# Patient Record
Sex: MALE | Race: WHITE | NOT HISPANIC OR LATINO | Employment: FULL TIME | URBAN - NONMETROPOLITAN AREA
[De-identification: names, ages, dates, MRNs, and addresses within clinical notes are randomized per-mention and may not be internally consistent; named-entity substitution may affect disease eponyms.]

---

## 2023-04-26 ENCOUNTER — OFFICE VISIT (OUTPATIENT)
Dept: URGENT CARE | Facility: CLINIC | Age: 28
End: 2023-04-26

## 2023-04-26 VITALS
SYSTOLIC BLOOD PRESSURE: 118 MMHG | TEMPERATURE: 96.7 F | DIASTOLIC BLOOD PRESSURE: 80 MMHG | RESPIRATION RATE: 16 BRPM | HEART RATE: 67 BPM | OXYGEN SATURATION: 98 %

## 2023-04-26 DIAGNOSIS — H10.32 ACUTE BACTERIAL CONJUNCTIVITIS OF LEFT EYE: Primary | ICD-10-CM

## 2023-04-26 RX ORDER — POLYMYXIN B SULFATE AND TRIMETHOPRIM 1; 10000 MG/ML; [USP'U]/ML
1 SOLUTION OPHTHALMIC EVERY 6 HOURS
Qty: 10 ML | Refills: 0 | Status: SHIPPED | OUTPATIENT
Start: 2023-04-26 | End: 2023-05-03

## 2023-04-26 NOTE — PROGRESS NOTES
[email or web address] [facility]        NAME: Shasta Srivastava is a 25 y o  male  : 1995    MRN: [id number]  DATE: 2023  TIME: 1:32 PM    Assessment and Plan   Acute bacterial conjunctivitis of left eye [H10 32]  1  Acute bacterial conjunctivitis of left eye  polymyxin b-trimethoprim (POLYTRIM) ophthalmic solution        Declined strep testing for sore throat  Patient Instructions     Use Polytrim as prescribed   Apply cold compresses  Avoid touching eyes  Wash hands frequently  Change pillowcases daily  Follow up with PCP in 3-5 days  Proceed to  ER if symptoms worsen  Chief Complaint     Chief Complaint   Patient presents with   • Conjunctivitis     Started 1 day ago, left redness, drainage, and this am left eye crusted shut  Request note for work         History of Present Illness       Eye Problem   The left eye is affected  This is a new problem  The current episode started yesterday  There was no injury mechanism  The pain is mild  There is no known exposure to pink eye  He does not wear contacts  Associated symptoms include an eye discharge, eye redness and itching (resolved)  Pertinent negatives include no blurred vision, fever or photophobia  Treatments tried: cool compresses; visine  Review of Systems   Review of Systems   Constitutional: Negative for chills and fever  HENT: Positive for sore throat  Negative for congestion, ear pain, rhinorrhea, sinus pressure and sinus pain  Eyes: Positive for discharge, redness and itching (resolved)  Negative for blurred vision, photophobia, pain and visual disturbance  Respiratory: Negative for cough            Current Medications       Current Outpatient Medications:   •  polymyxin b-trimethoprim (POLYTRIM) ophthalmic solution, Administer 1 drop into the left eye every 6 (six) hours for 7 days, Disp: 10 mL, Rfl: 0    Current Allergies     Allergies as of 2023   • (No Known Allergies)            The following portions of the patient's history were reviewed and updated as appropriate: allergies, current medications, past family history, past medical history, past social history, past surgical history and problem list      History reviewed  No pertinent past medical history  Past Surgical History:   Procedure Laterality Date   • CLAVICLE SURGERY Right    • FEMUR FRACTURE SURGERY Right        No family history on file  Medications have been verified  Objective   /80   Pulse 67   Temp (!) 96 7 °F (35 9 °C)   Resp 16   SpO2 98%   No LMP for male patient  Physical Exam     Physical Exam  Vitals reviewed  Constitutional:       General: He is not in acute distress  Appearance: He is well-developed  He is not diaphoretic  HENT:      Head: Normocephalic and atraumatic  Right Ear: Tympanic membrane, ear canal and external ear normal       Left Ear: Tympanic membrane, ear canal and external ear normal       Nose: Nose normal       Mouth/Throat:      Pharynx: Posterior oropharyngeal erythema present  No oropharyngeal exudate  Eyes:      General:         Right eye: No discharge  Left eye: No discharge  Comments: L conjunctiva erythematous  L sclera injected  Cardiovascular:      Rate and Rhythm: Normal rate and regular rhythm  Heart sounds: Normal heart sounds  No murmur heard  No friction rub  No gallop  Pulmonary:      Effort: Pulmonary effort is normal  No respiratory distress  Breath sounds: Normal breath sounds  No wheezing, rhonchi or rales  Lymphadenopathy:      Cervical: No cervical adenopathy  Skin:     General: Skin is warm  Neurological:      Mental Status: He is alert  Psychiatric:         Behavior: Behavior normal          Thought Content:  Thought content normal          Judgment: Judgment normal

## 2023-04-26 NOTE — LETTER
April 26, 2023     Patient: Viji Montes De Oca   YOB: 1995   Date of Visit: 4/26/2023       To Whom It May Concern:    Please excuse Viji Montes De Oca from work on 4/26 and 4/27  He may return to work on 4/28/2023  If you have any questions or concerns, please don't hesitate to call           Sincerely,        Catrachita May PA-C    CC: No Recipients